# Patient Record
Sex: FEMALE | URBAN - METROPOLITAN AREA
[De-identification: names, ages, dates, MRNs, and addresses within clinical notes are randomized per-mention and may not be internally consistent; named-entity substitution may affect disease eponyms.]

---

## 2023-04-21 ENCOUNTER — OFFICE VISIT (OUTPATIENT)
Facility: LOCATION | Age: 73
End: 2023-04-21
Payer: COMMERCIAL

## 2023-04-21 DIAGNOSIS — H40.013 OPEN ANGLE WITH BORDERLINE FINDINGS, LOW RISK, BILATERAL: ICD-10-CM

## 2023-04-21 DIAGNOSIS — H25.812 COMBINED FORMS OF AGE-RELATED CATARACT, LEFT EYE: ICD-10-CM

## 2023-04-21 DIAGNOSIS — H25.813 COMBINED FORMS OF AGE-RELATED CATARACT, BILATERAL: Primary | ICD-10-CM

## 2023-04-21 DIAGNOSIS — H04.123 DRY EYE SYNDROME OF BILATERAL LACRIMAL GLANDS: ICD-10-CM

## 2023-04-21 DIAGNOSIS — H16.223 KERATOCONJUNCT SICCA, NOT SPECIFIED AS SJOGREN'S, BILATERAL: ICD-10-CM

## 2023-04-21 PROCEDURE — 99204 OFFICE O/P NEW MOD 45 MIN: CPT | Performed by: OPHTHALMOLOGY

## 2023-04-21 PROCEDURE — 92250 FUNDUS PHOTOGRAPHY W/I&R: CPT | Performed by: OPHTHALMOLOGY

## 2023-04-21 PROCEDURE — 92134 CPTRZ OPH DX IMG PST SGM RTA: CPT | Performed by: OPHTHALMOLOGY

## 2023-04-21 PROCEDURE — 76519 ECHO EXAM OF EYE: CPT | Performed by: OPHTHALMOLOGY

## 2023-04-21 PROCEDURE — 92025 CPTRIZED CORNEAL TOPOGRAPHY: CPT | Performed by: OPHTHALMOLOGY

## 2023-04-21 RX ORDER — MOXIFLOXACIN 5 MG/ML
0.5 % SOLUTION/ DROPS OPHTHALMIC
Qty: 3 | Refills: 3 | Status: ACTIVE
Start: 2023-04-21

## 2023-04-21 RX ORDER — PREDNISOLONE ACETATE 10 MG/ML
1 % SUSPENSION/ DROPS OPHTHALMIC
Qty: 15 | Refills: 3 | Status: ACTIVE
Start: 2023-04-21

## 2023-04-21 RX ORDER — LATANOPROST 50 UG/ML
0.005 % SOLUTION OPHTHALMIC
Qty: 2.5 | Refills: 3 | Status: ACTIVE
Start: 2023-04-21

## 2023-04-21 RX ORDER — BROMFENAC SODIUM 0.7 MG/ML
0.07 % SOLUTION/ DROPS OPHTHALMIC
Qty: 3 | Refills: 3 | Status: ACTIVE
Start: 2023-04-21

## 2023-04-21 RX ORDER — ERYTHROMYCIN 5 MG/G
OINTMENT OPHTHALMIC
Qty: 3.5 | Refills: 11 | Status: ACTIVE
Start: 2023-04-21

## 2023-04-21 ASSESSMENT — VISUAL ACUITY
OD: 20/100
OS: 20/80

## 2023-04-21 ASSESSMENT — INTRAOCULAR PRESSURE
OS: 16
OD: 13

## 2023-04-21 NOTE — IMPRESSION/PLAN
Impression: Keratoconjunct sicca, not specified as Sjogren's, bilateral: U16.118. Plan: No Plugs indicated.

## 2023-04-21 NOTE — IMPRESSION/PLAN
Impression: Open angle with borderline findings, low risk, bilateral: H40.013. Plan: Disc Photos taken today. Patient to start Latanoprost QHS OU. Patient to have OCT ONH and VF in 2 weeks day of Pre-op. RTC in 1 year for re-evaluation with Dr. Vanna Cobb. OCT ONH, VF and Gonio to be performed.

## 2023-04-21 NOTE — IMPRESSION/PLAN
Impression: Combined forms of age-related cataract, bilateral: H25.813. Plan: MAC OCT, Lenstar, TMS, and iTrace ordered today. Discussed R/B/A of cataract surgery including risk of bleeding, infection, decreased BCVA, loss of eye. No guarantees, possible need  for further surgery. Patient expressed good understanding and wishes to proceed with CE/IOL OD then OS. Patient elects to have Standard cataract sx OU. Patient aware she will need to wear glasses after sx. Patient to see Retina Specialist for Retina Clearance. Patient to have Cataract sx OD 05/23/23. OS 06/06/2023. RTC in 1 week for pre-op. Same day PO possible wound Burp. AM Sx. Cataract surgery packet given to patient. Patient advised to bring their surgery drops to all their upcoming appointments and that their surgery could be canceled if they do not bring their drops to their surgery.

## 2023-04-21 NOTE — IMPRESSION/PLAN
Impression: Dry eye syndrome of bilateral lacrimal glands: H04.123. Plan: Patient to start the use of Artificial Tears QID OU and hot moist compresses BID OU. Patient expresses understanding.

## 2023-06-14 NOTE — IMPRESSION/PLAN
Impression:  Encounter for surgical aftercare following surgery on a sense organ  Z48.810. Examination shows 2+ edema, ceidel negative and contact lens in place. Recommending patient to continue with all medications. Plan: RTC as scheduled with Dr Kulwant Francis.

## 2023-06-19 NOTE — IMPRESSION/PLAN
Impression: Combined forms of age-related cataract, left eye: H25.812. Plan: Discussed R/B/A of cataract surgery including risk of bleeding, infection, decreased BCVA, loss of eye. No guarantees, possible need  for further surgery. Patient expressed good understanding and wishes to proceed with CE/IOL OS.

## 2023-06-19 NOTE — IMPRESSION/PLAN
Impression: S/P CE/IOL standard monofocal MX60E +22.0 OD - 6 Days. Encounter for surgical aftercare following surgery on a sense organ  Z48.810. 
s/p CEIOL surgery doing well. PCIOL centered and clear. (-) Siedel sign Patient is pseudophakic Plan: Patient to discontinue antibiotic, decrease steroid to BID x 1 month, continue NSAID x 1 month. Schedule explained and given to patient. RTC 1 month - check VA, Ks, MR, trial frame distance and near for glasses rx (d/c suture first if present), IOP and re-evaluation of operated eye. All restrictions discontinued. Patient able to resume all normal activities RTC PRN decrease VA, increase pain, redness, discharge, photophobia, flashes/floaters, or other vision problems.

## 2023-06-21 NOTE — IMPRESSION/PLAN
Impression: S/P CE/IOL standard monofocal MX60E +21.50, goal: low myopia due to astigmatism OS - 1 Day. Presence of intraocular lens  Z96.1. Explained to patient vision limited due to ERM OU. Plan: Patient to continue antibiotic, steroid, and NSAID as instructed. RTC 1 week - check VA, Ks, MR, IOP and re-evaluation of operated eye. Patient reminded of post-operative restrictions (do not rub eye, do not bend over, do not  more than 10-15 lbs, do not sleep on side of surgery, do not get water, dust or dirt in eye, wear plastic shield while sleeping). Not co-managed. RTC PRN decrease VA, increase pain, redness, discharge, photophobia, flashes/floaters, or other vision problems.

## 2023-06-26 NOTE — IMPRESSION/PLAN
Impression: S/P CE/IOL standard monofocal MX60E +21.50, goal: low myopia due to astigmatism OS - 6 Days. Presence of intraocular lens  Z96.1.
s/p CEIOL surgery doing well. PCIOL centered and clear. (-) Siedel sign Patient is pseudophakic Plan: Patient to discontinue antibiotic, decrease steroid to BID x 1 month, continue NSAID x 1 month. Schedule explained and given to patient. RTC 1 month - check VA, Ks, MR, trial frame distance and near for glasses rx (d/c suture first if present), IOP and re-evaluation of operated eye. All restrictions discontinued. Patient able to resume all normal activities. RTC PRN decrease VA, increase pain, redness, discharge, photophobia, flashes/floaters, or other vision problems.